# Patient Record
Sex: FEMALE | Race: WHITE | NOT HISPANIC OR LATINO
[De-identification: names, ages, dates, MRNs, and addresses within clinical notes are randomized per-mention and may not be internally consistent; named-entity substitution may affect disease eponyms.]

---

## 2019-06-30 PROBLEM — Z00.00 ENCOUNTER FOR PREVENTIVE HEALTH EXAMINATION: Status: ACTIVE | Noted: 2019-06-30

## 2019-07-22 ENCOUNTER — OTHER (OUTPATIENT)
Age: 69
End: 2019-07-22

## 2019-07-22 DIAGNOSIS — M25.559 PAIN IN UNSPECIFIED HIP: ICD-10-CM

## 2019-07-30 ENCOUNTER — APPOINTMENT (OUTPATIENT)
Dept: ORTHOPEDIC SURGERY | Facility: CLINIC | Age: 69
End: 2019-07-30
Payer: MEDICARE

## 2019-07-30 VITALS
BODY MASS INDEX: 26.5 KG/M2 | HEART RATE: 73 BPM | HEIGHT: 60 IN | WEIGHT: 135 LBS | SYSTOLIC BLOOD PRESSURE: 177 MMHG | DIASTOLIC BLOOD PRESSURE: 94 MMHG

## 2019-07-30 DIAGNOSIS — Z86.79 PERSONAL HISTORY OF OTHER DISEASES OF THE CIRCULATORY SYSTEM: ICD-10-CM

## 2019-07-30 DIAGNOSIS — Z87.39 PERSONAL HISTORY OF OTHER DISEASES OF THE MUSCULOSKELETAL SYSTEM AND CONNECTIVE TISSUE: ICD-10-CM

## 2019-07-30 DIAGNOSIS — Z80.9 FAMILY HISTORY OF MALIGNANT NEOPLASM, UNSPECIFIED: ICD-10-CM

## 2019-07-30 DIAGNOSIS — Z87.891 PERSONAL HISTORY OF NICOTINE DEPENDENCE: ICD-10-CM

## 2019-07-30 DIAGNOSIS — Z78.9 OTHER SPECIFIED HEALTH STATUS: ICD-10-CM

## 2019-07-30 DIAGNOSIS — Z82.62 FAMILY HISTORY OF OSTEOPOROSIS: ICD-10-CM

## 2019-07-30 DIAGNOSIS — Z85.828 PERSONAL HISTORY OF OTHER MALIGNANT NEOPLASM OF SKIN: ICD-10-CM

## 2019-07-30 DIAGNOSIS — Z86.39 PERSONAL HISTORY OF OTHER ENDOCRINE, NUTRITIONAL AND METABOLIC DISEASE: ICD-10-CM

## 2019-07-30 PROCEDURE — 73502 X-RAY EXAM HIP UNI 2-3 VIEWS: CPT

## 2019-07-30 PROCEDURE — 99203 OFFICE O/P NEW LOW 30 MIN: CPT

## 2019-07-30 RX ORDER — CARVEDILOL 3.12 MG/1
TABLET, FILM COATED ORAL
Refills: 0 | Status: ACTIVE | COMMUNITY

## 2019-07-30 RX ORDER — EPLERENONE 50 MG/1
TABLET, COATED ORAL
Refills: 0 | Status: ACTIVE | COMMUNITY

## 2019-07-30 RX ORDER — ALPRAZOLAM 2 MG/1
TABLET ORAL
Refills: 0 | Status: ACTIVE | COMMUNITY

## 2019-07-30 RX ORDER — LOSARTAN POTASSIUM 100 MG/1
TABLET, FILM COATED ORAL
Refills: 0 | Status: ACTIVE | COMMUNITY

## 2019-07-30 RX ORDER — SIMVASTATIN 40 MG/1
TABLET, FILM COATED ORAL
Refills: 0 | Status: ACTIVE | COMMUNITY

## 2019-07-30 NOTE — HISTORY OF PRESENT ILLNESS
[de-identified] : The patient is a 69 year old female being seen for evaluation of her left hip. She denies injury, trauma, or change of activity. She reports in May developing groin pain while having physical therapy for left knee pain. She is ambulating and transferring with pain and stiffness. She reports pain is centered in the groin. She reports pain is exacerbated with weightbearing. She denies locking or giving way of the left hip. She reports previously having a cortisone injection in the trochanteric bursa with mild relief. She reports continuing physical therapy with mild relief. She comes in today for evaluation of her left hip and for treatment options.

## 2019-07-30 NOTE — ADDENDUM
[FreeTextEntry1] : This note was authored by Joon Boles working as a medical scribe for Dr. Guzman Ashraf. The note was reviewed, edited, and revised by Dr. Guzman Ashraf whom is in agreement with the exam findings, imaging findings, and treatment plan. 07/30/2019.

## 2019-07-30 NOTE — PHYSICAL EXAM
[de-identified] : The patient appears well nourished  and in no apparent distress.  The patient is alert and oriented to person, place, and time.   Affect and mood appear normal. The head is normocephalic and atraumatic.  The eyes reveal normal sclera and extra ocular muscles are intact. The tongue is midline with no apparent lesions.  Skin shows normal turgor with no evidence of eczema or psoriasis.  No respiratory distress noted.  Sensation grossly intact.\par   [de-identified] : Exam of the left hip shows SLR without difficulty, hip flexion of 110 degrees, external rotation of 60 degrees, internal rotation of 20 degrees with lateral hip pain, pain with palpation of the trochanteric bursa. 5/5 motor strength bilaterally distally. Sensation intact distally.  [de-identified] : Xray- AP pelvis and 2 views left hip shows mild arthritis of the left hip.\par \par MRI - performed at [facility] on 5/12/2019 of the left hip- \par \par  Acute nondisplaced subchondral fracture, anterior left femoral head.\par \par Mildly degenerated labrum without tear.\par \par Mild osteoarthritis\par \par Left gluteus minimus tendinopathy. Old partial tear.

## 2019-07-30 NOTE — CONSULT LETTER
[Dear  ___] : Dear  [unfilled], [Consult Letter:] : I had the pleasure of evaluating your patient, [unfilled]. [Consult Closing:] : Thank you very much for allowing me to participate in the care of this patient.  If you have any questions, please do not hesitate to contact me. [Please see my note below.] : Please see my note below. [Sincerely,] : Sincerely, [FreeTextEntry2] : SAMIR Umanzor MD [FreeTextEntry3] : Guzman Ashraf MD\par Chief of Joint Replacement\par [facility] \par [facility]

## 2019-09-04 ENCOUNTER — OTHER (OUTPATIENT)
Age: 69
End: 2019-09-04

## 2020-04-09 PROBLEM — M25.559 HIP PAIN: Status: ACTIVE | Noted: 2019-07-22

## 2020-05-20 ENCOUNTER — APPOINTMENT (OUTPATIENT)
Dept: DISASTER EMERGENCY | Facility: CLINIC | Age: 70
End: 2020-05-20

## 2020-05-20 DIAGNOSIS — Z01.818 ENCOUNTER FOR OTHER PREPROCEDURAL EXAMINATION: ICD-10-CM

## 2020-05-21 LAB — SARS-COV-2 N GENE NPH QL NAA+PROBE: NOT DETECTED

## 2020-05-22 ENCOUNTER — RESULT REVIEW (OUTPATIENT)
Age: 70
End: 2020-05-22

## 2021-07-22 ENCOUNTER — RESULT REVIEW (OUTPATIENT)
Age: 71
End: 2021-07-22

## 2022-03-31 NOTE — DISCUSSION/SUMMARY
Pt called and informed of the following message:    Tom Escobar MD  P  Nurse Message Pool   Please inform the patient that her thyroid appears to be mildly under active.  She will need to follow up either with a primary care doctor or a referral to an endocrine doctor.  Thank you.     Pt verbalized understanding. No questions at this time.    [de-identified] : The patient is a 69 year old female with mild arthritis and a nondisplaced subchondral fracture of the left hip. She also has trochanteric bursitis.  He bursitis symptoms have improved after a cortisone injection by another physician.  I recommended physical therapy for IT band and tensor fascia stretching and range of motion and strengthening of the hip.  The subchondral fracture is in the anterior aspect of the femoral head which is in a less weightbearing area.  Her symptoms overall improving and I do not think a hip replacement is necessary at this time.  His future if this progresses we may reconsider.  For now she was advised to utilize a cane or walker whenever she develops an episode of pain and if it persists to contact us for followup

## 2022-07-05 ENCOUNTER — APPOINTMENT (OUTPATIENT)
Dept: ORTHOPEDIC SURGERY | Facility: CLINIC | Age: 72
End: 2022-07-05

## 2022-07-06 ENCOUNTER — APPOINTMENT (OUTPATIENT)
Dept: ORTHOPEDIC SURGERY | Facility: CLINIC | Age: 72
End: 2022-07-06

## 2022-07-06 VITALS
SYSTOLIC BLOOD PRESSURE: 150 MMHG | HEART RATE: 71 BPM | DIASTOLIC BLOOD PRESSURE: 87 MMHG | OXYGEN SATURATION: 99 % | WEIGHT: 130 LBS | BODY MASS INDEX: 25.52 KG/M2 | HEIGHT: 60 IN

## 2022-07-06 DIAGNOSIS — M84.452A PATHOLOGICAL FRACTURE, LEFT FEMUR, INITIAL ENCOUNTER FOR FRACTURE: ICD-10-CM

## 2022-07-06 DIAGNOSIS — M16.12 UNILATERAL PRIMARY OSTEOARTHRITIS, LEFT HIP: ICD-10-CM

## 2022-07-06 PROCEDURE — 73502 X-RAY EXAM HIP UNI 2-3 VIEWS: CPT | Mod: 26,LT

## 2022-07-06 PROCEDURE — 99214 OFFICE O/P EST MOD 30 MIN: CPT

## 2022-07-06 NOTE — PHYSICAL EXAM
[de-identified] : Multi body exam \par The patient appears well nourished and in no apparent distress. The patient is alert and oriented to person, place, and time. Affect and mood appear normal. The head is normocephalic and atraumatic. The eyes reveal normal sclera and extra ocular muscles are intact. The tongue is midline with no apparent lesions. Skin shows normal turgor with no evidence of eczema or psoriasis. No respiratory distress noted. Sensation grossly intact.\par   [de-identified] : Exam left hip: Skin is within normal limits there is no effusion.  Straight leg raise is smooth and intact.  Flexion to 100 degrees, external rotation to 35 degrees, internal rotation to 25 degrees, all with some pain to the groin.  Positive labral impingement test.  There is tenderness to palpation of the left GT bursa. [de-identified] : X-ray 2 views left hip and AP pelvis demonstrate mild osteoarthritis of the left hip, no signs of fracture.

## 2022-07-06 NOTE — HISTORY OF PRESENT ILLNESS
[de-identified] : Patient is 72-year-old female presenting for evaluation of longstanding intermittent left hip pain with recent exacerbation 2 weeks ago.  She states that 2 weeks ago she was simply turning over in bed when she felt a "crunching" to the groin and has since had an intense left groin pain.  In addition to this she has lateral hip pain on the left side.  Patient denies any other trauma denies any falls.  She notes the pain is worse with weightbearing activity including walking any distance and rising from a seated position.  She has not had injections intra-articularly.  She has tried therapy in the past without relief.  She takes anti-inflammatories and Tylenol without significant improvement.  She had an MRI in 2019 revealing subchondral insufficiency fracture, but no more recent MRI from this.

## 2022-07-06 NOTE — DISCUSSION/SUMMARY
[de-identified] : Patient is a 72-year-old female with mild osteoarthritis of the left hip, with GT bursitis, and possible stress fracture versus labral tear.  Her pain seems to be out of proportion to x-ray findings.  She is most concerned with the groin pain at this time and would like to leave the GT bursa alone.  I am sending patient for an MRI to rule out stress fracture vs labral tear.  She will use over-the-counter anti-inflammatories and Tylenol for now.  She will return once MRI is complete.

## 2022-07-21 ENCOUNTER — APPOINTMENT (OUTPATIENT)
Dept: MRI IMAGING | Facility: CLINIC | Age: 72
End: 2022-07-21

## 2022-07-21 ENCOUNTER — OUTPATIENT (OUTPATIENT)
Dept: OUTPATIENT SERVICES | Facility: HOSPITAL | Age: 72
LOS: 1 days | End: 2022-07-21
Payer: MEDICARE

## 2022-07-21 DIAGNOSIS — M16.12 UNILATERAL PRIMARY OSTEOARTHRITIS, LEFT HIP: ICD-10-CM

## 2022-07-21 PROCEDURE — 73721 MRI JNT OF LWR EXTRE W/O DYE: CPT | Mod: 26,LT,MH

## 2022-07-21 PROCEDURE — 73721 MRI JNT OF LWR EXTRE W/O DYE: CPT

## 2022-08-09 ENCOUNTER — APPOINTMENT (OUTPATIENT)
Dept: ORTHOPEDIC SURGERY | Facility: CLINIC | Age: 72
End: 2022-08-09

## 2022-08-09 VITALS
SYSTOLIC BLOOD PRESSURE: 144 MMHG | BODY MASS INDEX: 25.52 KG/M2 | HEART RATE: 76 BPM | DIASTOLIC BLOOD PRESSURE: 83 MMHG | HEIGHT: 60 IN | WEIGHT: 130 LBS

## 2022-08-09 DIAGNOSIS — S73.192A OTHER SPRAIN OF LEFT HIP, INITIAL ENCOUNTER: ICD-10-CM

## 2022-08-09 PROCEDURE — 99213 OFFICE O/P EST LOW 20 MIN: CPT

## 2022-08-09 NOTE — PHYSICAL EXAM
[de-identified] : The patient appears well nourished  and in no apparent distress.  The patient is alert and oriented to person, place, and time.   Affect and mood appear normal. The head is normocephalic and atraumatic.  The eyes reveal normal sclera and extra ocular muscles are intact. The tongue is midline with no apparent lesions.  Skin shows normal turgor with no evidence of eczema or psoriasis.  No respiratory distress noted.  Sensation grossly intact.		  [de-identified] :  Exam left hip: Skin is within normal limits there is no effusion. Straight leg raise is smooth and intact. Flexion to 100 degrees, external rotation to 35 degrees, internal rotation to 25 degrees, all without pain.  \par 	  [de-identified] : EXAM: [id number] - MRI HIP LT  - ORDERED BY: PARISA WADE\par PROCEDURE DATE:  07/21/2022\par IMPRESSION:\par No fracture. Focal undersurface anterior superior labral tear.\par Mild to moderate left hip arthrosis\par Small hip effusion/synovitis.  Subgluteus and greater trochanteric bursitis.\par  \par

## 2022-08-09 NOTE — ADDENDUM
[FreeTextEntry1] : This note was authored by Vikram Chu working as a medical scribe for Dr. Guzman Ashraf. The note was reviewed, edited, and revised by Dr. Guzman Ashraf whom is in agreement with the exam findings, imaging findings, and treatment plan. 08/09/2022

## 2022-08-09 NOTE — DISCUSSION/SUMMARY
[de-identified] : TAZ HUNT is a 72 year female who presents with moderate left hip arthritis. The patient declined an intraarticular cortisone injection order today because her symptoms are minimal currently, however, the patient will follow up with the office if she gets to a point where she feels her pain warrants an order for such an injection.  If that is the case she may call the office and we will schedule her for an injection with interventional radiology. she would then follow-up with me 3 weeks after the injection.

## 2022-08-09 NOTE — HISTORY OF PRESENT ILLNESS
[de-identified] : Patient is 72-year-old female presenting for evaluation of longstanding intermittent left hip pain with exacerbation 6 weeks ago.  She states that 6 weeks ago she was simply turning over in bed when she felt a "crunching" to the groin and has since had an intense left groin pain.  In addition to this she has lateral hip pain on the left side.  Patient denies any other trauma denies any falls.  She notes the pain is worse with weightbearing activity including walking any distance and rising from a seated position.  She has not had injections intra-articularly.  She has tried therapy in the past without relief.  She takes anti-inflammatories and Tylenol without significant improvement.  She had an MRI in 2019 revealing subchondral insufficiency fracture. She was seen 4 weeks ago and sent for a repeat MRI to rule out subchondral insufficiency fracture, due to pain out of proportion to physical exam and xray findings. She returns for MRI follow up.

## 2022-10-27 ENCOUNTER — RESULT REVIEW (OUTPATIENT)
Age: 72
End: 2022-10-27

## 2022-11-17 ENCOUNTER — OFFICE (OUTPATIENT)
Dept: URBAN - METROPOLITAN AREA CLINIC 112 | Facility: CLINIC | Age: 72
Setting detail: OPHTHALMOLOGY
End: 2022-11-17
Payer: MEDICARE

## 2022-11-17 DIAGNOSIS — H16.223: ICD-10-CM

## 2022-11-17 DIAGNOSIS — H25.11: ICD-10-CM

## 2022-11-17 DIAGNOSIS — H25.12: ICD-10-CM

## 2022-11-17 DIAGNOSIS — H25.13: ICD-10-CM

## 2022-11-17 PROCEDURE — 99213 OFFICE O/P EST LOW 20 MIN: CPT | Performed by: OPHTHALMOLOGY

## 2022-11-17 ASSESSMENT — CONFRONTATIONAL VISUAL FIELD TEST (CVF)
OS_FINDINGS: FULL
OD_FINDINGS: FULL

## 2022-11-17 ASSESSMENT — PUNCTA - ASSESSMENT
OD_PUNCTA: SIL PLUG
OS_PUNCTA: SIL PLUG

## 2022-11-17 ASSESSMENT — LID EXAM ASSESSMENTS
OD_BLEPHARITIS: RLL 2+
OS_TRICHIASIS: LLL T
OS_BLEPHARITIS: LLL 2+
OD_COMMENTS: MILDLY RED AND SCALY

## 2022-11-17 ASSESSMENT — KERATOMETRY
OD_AXISANGLE_DEGREES: 055
OS_K1POWER_DIOPTERS: 42.00
OD_K1POWER_DIOPTERS: 45.50
OS_AXISANGLE_DEGREES: 110
OD_K2POWER_DIOPTERS: 45.75
OS_K2POWER_DIOPTERS: 43.25

## 2022-11-17 ASSESSMENT — VISUAL ACUITY
OS_BCVA: 20/100-1
OD_BCVA: 20/40

## 2022-11-17 ASSESSMENT — SUPERFICIAL PUNCTATE KERATITIS (SPK)
OS_SPK: 1+
OD_SPK: 1+

## 2022-11-17 ASSESSMENT — TONOMETRY: OD_IOP_MMHG: 11

## 2023-01-18 ASSESSMENT — REFRACTION_MANIFEST
OS_VA1: 20/40+
OD_CYLINDER: SPH
OD_CYLINDER: SPH
OD_SPHERE: -3.75
OD_VA1: 20/30
OS_VA1: 20/40-
OS_SPHERE: +0.75
OD_SPHERE: -3.75
OS_SPHERE: +0.75
OS_AXIS: 012
OD_VA1: 20/30
OS_CYLINDER: SPH
OS_CYLINDER: -0.75

## 2023-01-18 ASSESSMENT — REFRACTION_CURRENTRX
OS_SPHERE: +2.25
OD_OVR_VA: 20/
OS_OVR_VA: 20/
OD_SPHERE: +2.25

## 2023-01-18 ASSESSMENT — KERATOMETRY
OD_K2POWER_DIOPTERS: 45.75
OD_AXISANGLE_DEGREES: 055
OS_AXISANGLE_DEGREES: 110
OS_K1POWER_DIOPTERS: 42.00
OS_K2POWER_DIOPTERS: 43.25
OD_K1POWER_DIOPTERS: 45.50

## 2023-01-18 ASSESSMENT — REFRACTION_AUTOREFRACTION
OD_AXIS: 013
OS_CYLINDER: -0.75
OS_SPHERE: +1.00
OD_SPHERE: -4.25
OS_AXIS: 013
OD_CYLINDER: -0.75
OS_SPHERE: +1.00
OD_CYLINDER: -0.50
OD_AXIS: 100
OS_CYLINDER: -1.00
OD_SPHERE: -3.50
OS_AXIS: 020

## 2023-01-18 ASSESSMENT — SPHEQUIV_DERIVED
OD_SPHEQUIV: -3.75
OS_SPHEQUIV: 0.625
OS_SPHEQUIV: 0.5
OD_SPHEQUIV: -4.625
OS_SPHEQUIV: 0.375

## 2023-01-18 ASSESSMENT — AXIALLENGTH_DERIVED
OD_AL: 24.6595
OS_AL: 23.6701
OS_AL: 23.7192
OD_AL: 24.2926
OS_AL: 23.7685

## 2023-01-20 ENCOUNTER — OFFICE (OUTPATIENT)
Dept: URBAN - METROPOLITAN AREA CLINIC 94 | Facility: CLINIC | Age: 73
Setting detail: OPHTHALMOLOGY
End: 2023-01-20
Payer: MEDICARE

## 2023-01-20 ENCOUNTER — RX ONLY (RX ONLY)
Age: 73
End: 2023-01-20

## 2023-01-20 ENCOUNTER — OFFICE (OUTPATIENT)
Dept: URBAN - METROPOLITAN AREA CLINIC 116 | Facility: CLINIC | Age: 73
Setting detail: OPHTHALMOLOGY
End: 2023-01-20
Payer: MEDICARE

## 2023-01-20 DIAGNOSIS — H25.13: ICD-10-CM

## 2023-01-20 DIAGNOSIS — H25.12: ICD-10-CM

## 2023-01-20 DIAGNOSIS — H16.223: ICD-10-CM

## 2023-01-20 DIAGNOSIS — Z01.812: ICD-10-CM

## 2023-01-20 DIAGNOSIS — H25.11: ICD-10-CM

## 2023-01-20 DIAGNOSIS — Z20.822: ICD-10-CM

## 2023-01-20 PROCEDURE — 99211 OFF/OP EST MAY X REQ PHY/QHP: CPT | Performed by: OPHTHALMOLOGY

## 2023-01-20 PROCEDURE — N/C NO CHARGE: Performed by: OPTOMETRIST

## 2023-01-20 ASSESSMENT — REFRACTION_AUTOREFRACTION
OS_CYLINDER: -0.75
OD_SPHERE: -3.50
OS_SPHERE: +0.75
OD_CYLINDER: -0.50
OD_SPHERE: -4.00
OS_AXIS: 005
OS_AXIS: 020
OD_CYLINDER: -1.25
OS_CYLINDER: -1.25
OD_AXIS: 100
OD_AXIS: 010
OS_SPHERE: +1.00

## 2023-01-20 ASSESSMENT — SUPERFICIAL PUNCTATE KERATITIS (SPK)
OD_SPK: 1+
OS_SPK: 1+

## 2023-01-20 ASSESSMENT — KERATOMETRY
OS_K2POWER_DIOPTERS: 43.25
OS_AXISANGLE_DEGREES: 110
OD_AXISANGLE_DEGREES: 095
OD_K2POWER_DIOPTERS: 47.75
OS_K1POWER_DIOPTERS: 42.00
OD_K1POWER_DIOPTERS: 45.50

## 2023-01-20 ASSESSMENT — VISUAL ACUITY
OD_BCVA: 20/40
OS_BCVA: 20/50

## 2023-01-20 ASSESSMENT — REFRACTION_MANIFEST
OS_CYLINDER: SPH
OD_CYLINDER: SPH
OD_VA1: 20/30
OS_CYLINDER: SPH
OD_VA1: 20/30
OS_VA1: 20/40+
OS_SPHERE: +0.75
OD_SPHERE: -3.75
OS_VA1: 20/40-
OS_SPHERE: +0.75
OS_SPHERE: +0.75
OS_CYLINDER: -0.75
OD_SPHERE: -3.75
OS_AXIS: 012
OD_CYLINDER: SPH
OD_SPHERE: -4.00
OD_CYLINDER: SPH

## 2023-01-20 ASSESSMENT — AXIALLENGTH_DERIVED
OS_AL: 23.8678
OS_AL: 23.6701
OD_AL: 24.2646
OS_AL: 23.7685
OD_AL: 23.9093

## 2023-01-20 ASSESSMENT — LID EXAM ASSESSMENTS
OD_COMMENTS: MILDLY RED AND SCALY
OD_BLEPHARITIS: RLL 2+
OS_BLEPHARITIS: LLL 2+
OS_TRICHIASIS: LLL T

## 2023-01-20 ASSESSMENT — REFRACTION_CURRENTRX
OD_SPHERE: +2.25
OS_SPHERE: +2.25
OD_OVR_VA: 20/
OS_OVR_VA: 20/

## 2023-01-20 ASSESSMENT — CONFRONTATIONAL VISUAL FIELD TEST (CVF)
OD_FINDINGS: FULL
OS_FINDINGS: FULL

## 2023-01-20 ASSESSMENT — PUNCTA - ASSESSMENT
OS_PUNCTA: SIL PLUG
OD_PUNCTA: SIL PLUG

## 2023-01-20 ASSESSMENT — SPHEQUIV_DERIVED
OS_SPHEQUIV: 0.625
OD_SPHEQUIV: -3.75
OS_SPHEQUIV: 0.125
OD_SPHEQUIV: -4.625
OS_SPHEQUIV: 0.375

## 2023-01-20 ASSESSMENT — TONOMETRY
OD_IOP_MMHG: 11
OS_IOP_MMHG: 13

## 2023-01-23 ASSESSMENT — SPHEQUIV_DERIVED
OD_SPHEQUIV: -4.625
OS_SPHEQUIV: 0.625
OD_SPHEQUIV: -3.75
OS_SPHEQUIV: 0.375
OS_SPHEQUIV: 0.125

## 2023-01-23 ASSESSMENT — AXIALLENGTH_DERIVED
OD_AL: 24.4605
OS_AL: 23.5835
OS_AL: 23.4866
OD_AL: 24.0994
OS_AL: 23.6813

## 2023-01-23 ASSESSMENT — REFRACTION_AUTOREFRACTION
OS_AXIS: 020
OD_SPHERE: -4.00
OD_CYLINDER: -0.50
OS_CYLINDER: -1.25
OS_SPHERE: +1.00
OD_AXIS: 010
OS_SPHERE: +0.75
OS_AXIS: 005
OD_CYLINDER: -1.25
OS_CYLINDER: -0.75
OD_AXIS: 100
OD_SPHERE: -3.50

## 2023-01-23 ASSESSMENT — KERATOMETRY
OS_K1POWER_DIOPTERS: 42.50
OD_CYLAXISANGLE_DEGREES: 95
OD_AXISANGLE2_DEGREES: 095
OD_K2POWER_DIOPTERS: 46.75
OS_AXISANGLE_DEGREES: 110
OS_AXISANGLE_DEGREES: 151
OD_K1POWER_DIOPTERS: 45.50
OS_CYLPOWER_DEGREES: 1.25
OD_AXISANGLE_DEGREES: 095
OS_K2POWER_DIOPTERS: 43.75
OS_K1POWER_DIOPTERS: 42.00
OS_AXISANGLE2_DEGREES: 151
OS_CYLAXISANGLE_DEGREES: 151
OS_K1K2_AVERAGE: 43.125
OD_CYLPOWER_DEGREES: 1.25
OD_K1POWER_DIOPTERS: 45.50
OD_AXISANGLE_DEGREES: 095
OD_K1K2_AVERAGE: 46.125
OS_K2POWER_DIOPTERS: 43.25
OD_K2POWER_DIOPTERS: 47.75

## 2023-01-23 ASSESSMENT — REFRACTION_CURRENTRX
OS_SPHERE: +2.25
OS_OVR_VA: 20/
OD_SPHERE: +2.25
OD_OVR_VA: 20/

## 2023-01-23 ASSESSMENT — REFRACTION_MANIFEST
OD_VA1: 20/30
OD_CYLINDER: SPH
OS_VA1: 20/40+
OD_SPHERE: -3.75
OS_CYLINDER: SPH
OS_AXIS: 012
OS_CYLINDER: SPH
OS_SPHERE: +0.75
OD_SPHERE: -3.75
OS_VA1: 20/40-
OS_SPHERE: +0.75
OD_SPHERE: -4.00
OD_CYLINDER: SPH
OS_CYLINDER: -0.75
OS_SPHERE: +0.75
OD_CYLINDER: SPH
OD_VA1: 20/30

## 2023-01-23 ASSESSMENT — SUPERFICIAL PUNCTATE KERATITIS (SPK)
OD_SPK: 1+
OS_SPK: 1+

## 2023-01-23 ASSESSMENT — VISUAL ACUITY
OD_BCVA: 20/40
OS_BCVA: 20/50

## 2023-01-25 ENCOUNTER — ASC (OUTPATIENT)
Dept: URBAN - METROPOLITAN AREA SURGERY 8 | Facility: SURGERY | Age: 73
Setting detail: OPHTHALMOLOGY
End: 2023-01-25
Payer: MEDICARE

## 2023-01-25 DIAGNOSIS — H25.12: ICD-10-CM

## 2023-01-25 DIAGNOSIS — H52.212: ICD-10-CM

## 2023-01-25 PROCEDURE — 66984 XCAPSL CTRC RMVL W/O ECP: CPT | Performed by: OPHTHALMOLOGY

## 2023-01-25 PROCEDURE — FEMTO CATARACT LASER: Performed by: OPHTHALMOLOGY

## 2023-01-25 PROCEDURE — A9270 NON-COVERED ITEM OR SERVICE: HCPCS | Performed by: OPHTHALMOLOGY

## 2023-01-26 ENCOUNTER — RX ONLY (RX ONLY)
Age: 73
End: 2023-01-26

## 2023-01-26 ENCOUNTER — OFFICE (OUTPATIENT)
Dept: URBAN - METROPOLITAN AREA CLINIC 94 | Facility: CLINIC | Age: 73
Setting detail: OPHTHALMOLOGY
End: 2023-01-26
Payer: MEDICARE

## 2023-01-26 DIAGNOSIS — Z96.1: ICD-10-CM

## 2023-01-26 PROCEDURE — 99024 POSTOP FOLLOW-UP VISIT: CPT | Performed by: OPHTHALMOLOGY

## 2023-01-26 ASSESSMENT — REFRACTION_AUTOREFRACTION
OD_CYLINDER: -0.50
OS_CYLINDER: -1.00
OD_SPHERE: -3.50
OD_CYLINDER: -0.50
OD_SPHERE: -3.75
OD_AXIS: 005
OS_SPHERE: -0.50
OS_AXIS: 040
OS_CYLINDER: -0.75
OS_SPHERE: +1.00
OS_AXIS: 020
OD_AXIS: 100

## 2023-01-26 ASSESSMENT — KERATOMETRY
OS_K2POWER_DIOPTERS: 43.75
OD_K2POWER_DIOPTERS: 46.75
OD_K1POWER_DIOPTERS: 45.50
OD_AXISANGLE_DEGREES: 095
OS_AXISANGLE_DEGREES: 151
OS_K1POWER_DIOPTERS: 42.50

## 2023-01-26 ASSESSMENT — REFRACTION_MANIFEST
OS_CYLINDER: SPH
OD_CYLINDER: SPH
OD_VA1: 20/30
OS_AXIS: 012
OD_SPHERE: -4.00
OD_SPHERE: -3.75
OD_CYLINDER: SPH
OS_CYLINDER: SPH
OS_VA1: 20/40-
OS_SPHERE: +0.75
OS_SPHERE: +0.75
OD_VA1: 20/30
OS_SPHERE: +0.75
OD_CYLINDER: SPH
OS_VA1: 20/40+
OS_CYLINDER: -0.75
OD_SPHERE: -3.75

## 2023-01-26 ASSESSMENT — LID EXAM ASSESSMENTS
OD_COMMENTS: MILDLY RED AND SCALY
OS_BLEPHARITIS: LLL 2+
OS_TRICHIASIS: LLL T
OD_BLEPHARITIS: RLL 2+

## 2023-01-26 ASSESSMENT — AXIALLENGTH_DERIVED
OS_AL: 24.1313
OD_AL: 24.0994
OS_AL: 23.4866
OS_AL: 23.5835
OD_AL: 24.2015

## 2023-01-26 ASSESSMENT — REFRACTION_CURRENTRX
OD_OVR_VA: 20/
OS_OVR_VA: 20/
OS_SPHERE: +2.25
OD_SPHERE: +2.25

## 2023-01-26 ASSESSMENT — SPHEQUIV_DERIVED
OD_SPHEQUIV: -3.75
OS_SPHEQUIV: 0.625
OD_SPHEQUIV: -4
OS_SPHEQUIV: 0.375
OS_SPHEQUIV: -1

## 2023-01-26 ASSESSMENT — VISUAL ACUITY
OS_BCVA: 20/100-1
OD_BCVA: 20/25

## 2023-01-26 ASSESSMENT — PUNCTA - ASSESSMENT
OD_PUNCTA: SIL PLUG
OS_PUNCTA: SIL PLUG

## 2023-01-26 ASSESSMENT — SUPERFICIAL PUNCTATE KERATITIS (SPK)
OD_SPK: 1+
OS_SPK: 1+

## 2023-01-26 ASSESSMENT — TONOMETRY
OS_IOP_MMHG: 14
OD_IOP_MMHG: 15

## 2023-02-02 ENCOUNTER — OFFICE (OUTPATIENT)
Dept: URBAN - METROPOLITAN AREA CLINIC 112 | Facility: CLINIC | Age: 73
Setting detail: OPHTHALMOLOGY
End: 2023-02-02
Payer: MEDICARE

## 2023-02-02 DIAGNOSIS — Z96.1: ICD-10-CM

## 2023-02-02 DIAGNOSIS — H25.11: ICD-10-CM

## 2023-02-02 PROCEDURE — 99024 POSTOP FOLLOW-UP VISIT: CPT | Performed by: OPHTHALMOLOGY

## 2023-02-02 PROCEDURE — 92136 OPHTHALMIC BIOMETRY: CPT | Performed by: OPHTHALMOLOGY

## 2023-02-02 ASSESSMENT — REFRACTION_MANIFEST
OD_VA1: 20/30
OS_VA1: 20/40+
OS_CYLINDER: SPH
OD_SPHERE: -3.75
OD_SPHERE: -4.00
OD_CYLINDER: SPH
OS_SPHERE: +0.75
OD_VA1: 20/30
OS_AXIS: 012
OS_CYLINDER: SPH
OS_VA1: 20/40-
OD_SPHERE: -3.75
OS_SPHERE: +0.75
OS_CYLINDER: -0.75
OS_SPHERE: +0.75
OD_CYLINDER: SPH
OD_CYLINDER: SPH

## 2023-02-02 ASSESSMENT — KERATOMETRY
OS_K2POWER_DIOPTERS: 43.75
OS_AXISANGLE_DEGREES: 151
OD_AXISANGLE_DEGREES: 095
OD_K1POWER_DIOPTERS: 45.50
OD_K2POWER_DIOPTERS: 46.75
OS_K1POWER_DIOPTERS: 42.50

## 2023-02-02 ASSESSMENT — REFRACTION_AUTOREFRACTION
OS_CYLINDER: -1.25
OD_AXIS: 023
OD_SPHERE: -3.50
OS_CYLINDER: -0.75
OS_SPHERE: +0.25
OD_CYLINDER: -0.25
OS_SPHERE: +1.00
OD_CYLINDER: -0.50
OD_SPHERE: -3.75
OS_AXIS: 42
OD_AXIS: 100
OS_AXIS: 020

## 2023-02-02 ASSESSMENT — AXIALLENGTH_DERIVED
OS_AL: 23.8792
OD_AL: 24.1504
OS_AL: 23.5835
OS_AL: 23.4866
OD_AL: 24.0994

## 2023-02-02 ASSESSMENT — REFRACTION_CURRENTRX
OS_SPHERE: +2.25
OS_OVR_VA: 20/
OD_SPHERE: +2.25
OD_OVR_VA: 20/

## 2023-02-02 ASSESSMENT — LID EXAM ASSESSMENTS
OS_BLEPHARITIS: LLL 2+
OD_COMMENTS: MILDLY RED AND SCALY
OS_TRICHIASIS: LLL T
OD_BLEPHARITIS: RLL 2+

## 2023-02-02 ASSESSMENT — SPHEQUIV_DERIVED
OD_SPHEQUIV: -3.75
OS_SPHEQUIV: 0.625
OS_SPHEQUIV: -0.375
OD_SPHEQUIV: -3.875
OS_SPHEQUIV: 0.375

## 2023-02-02 ASSESSMENT — VISUAL ACUITY
OS_BCVA: 20/80
OD_BCVA: 20/20

## 2023-02-02 ASSESSMENT — PUNCTA - ASSESSMENT
OD_PUNCTA: SIL PLUG
OS_PUNCTA: SIL PLUG

## 2023-02-02 ASSESSMENT — TONOMETRY
OS_IOP_MMHG: 16
OD_IOP_MMHG: 14

## 2023-02-02 ASSESSMENT — CONFRONTATIONAL VISUAL FIELD TEST (CVF)
OD_FINDINGS: FULL
OS_FINDINGS: FULL

## 2023-02-02 ASSESSMENT — SUPERFICIAL PUNCTATE KERATITIS (SPK)
OD_SPK: 1+
OS_SPK: 1+

## 2023-02-03 ENCOUNTER — OFFICE (OUTPATIENT)
Dept: URBAN - METROPOLITAN AREA CLINIC 94 | Facility: CLINIC | Age: 73
Setting detail: OPHTHALMOLOGY
End: 2023-02-03
Payer: MEDICARE

## 2023-02-03 DIAGNOSIS — Z20.822: ICD-10-CM

## 2023-02-03 DIAGNOSIS — Z01.812: ICD-10-CM

## 2023-02-03 PROCEDURE — 99211 OFF/OP EST MAY X REQ PHY/QHP: CPT | Performed by: OPHTHALMOLOGY

## 2023-02-06 ASSESSMENT — KERATOMETRY
OS_AXISANGLE_DEGREES: 151
OD_K1POWER_DIOPTERS: 45.50
OS_K2POWER_DIOPTERS: 43.75
OD_AXISANGLE_DEGREES: 095
OD_K2POWER_DIOPTERS: 46.75
OS_K1POWER_DIOPTERS: 42.50

## 2023-02-06 ASSESSMENT — SPHEQUIV_DERIVED
OS_SPHEQUIV: 0.375
OD_SPHEQUIV: -3.75
OS_SPHEQUIV: -0.375
OS_SPHEQUIV: 0.625
OD_SPHEQUIV: -3.875

## 2023-02-06 ASSESSMENT — REFRACTION_AUTOREFRACTION
OS_SPHERE: +1.00
OD_CYLINDER: -0.50
OS_AXIS: 42
OD_SPHERE: -3.75
OS_SPHERE: +0.25
OS_CYLINDER: -0.75
OD_AXIS: 023
OD_AXIS: 100
OD_SPHERE: -3.50
OD_CYLINDER: -0.25
OS_AXIS: 020
OS_CYLINDER: -1.25

## 2023-02-06 ASSESSMENT — VISUAL ACUITY
OS_BCVA: 20/80
OD_BCVA: 20/20

## 2023-02-06 ASSESSMENT — REFRACTION_MANIFEST
OS_SPHERE: +0.75
OD_SPHERE: -4.00
OS_SPHERE: +0.75
OD_VA1: 20/30
OS_CYLINDER: SPH
OD_CYLINDER: SPH
OD_SPHERE: -3.75
OS_SPHERE: +0.75
OS_VA1: 20/40-
OS_VA1: 20/40+
OD_SPHERE: -3.75
OD_CYLINDER: SPH
OD_CYLINDER: SPH
OS_CYLINDER: -0.75
OD_VA1: 20/30
OS_CYLINDER: SPH
OS_AXIS: 012

## 2023-02-06 ASSESSMENT — AXIALLENGTH_DERIVED
OS_AL: 23.8792
OD_AL: 24.0994
OD_AL: 24.1504
OS_AL: 23.4866
OS_AL: 23.5835

## 2023-02-06 ASSESSMENT — SUPERFICIAL PUNCTATE KERATITIS (SPK)
OD_SPK: 1+
OS_SPK: 1+

## 2023-02-06 ASSESSMENT — REFRACTION_CURRENTRX
OD_SPHERE: +2.25
OS_OVR_VA: 20/
OS_SPHERE: +2.25
OD_OVR_VA: 20/

## 2023-02-08 ENCOUNTER — ASC (OUTPATIENT)
Dept: URBAN - METROPOLITAN AREA SURGERY 8 | Facility: SURGERY | Age: 73
Setting detail: OPHTHALMOLOGY
End: 2023-02-08
Payer: MEDICARE

## 2023-02-08 DIAGNOSIS — H25.11: ICD-10-CM

## 2023-02-08 DIAGNOSIS — H52.211: ICD-10-CM

## 2023-02-08 PROCEDURE — 66984 XCAPSL CTRC RMVL W/O ECP: CPT | Performed by: OPHTHALMOLOGY

## 2023-02-08 PROCEDURE — FEMTO CATARACT LASER: Performed by: OPHTHALMOLOGY

## 2023-02-08 PROCEDURE — A9270 NON-COVERED ITEM OR SERVICE: HCPCS | Performed by: OPHTHALMOLOGY

## 2023-02-09 ENCOUNTER — RX ONLY (RX ONLY)
Age: 73
End: 2023-02-09

## 2023-02-09 ENCOUNTER — OFFICE (OUTPATIENT)
Dept: URBAN - METROPOLITAN AREA CLINIC 94 | Facility: CLINIC | Age: 73
Setting detail: OPHTHALMOLOGY
End: 2023-02-09

## 2023-02-09 DIAGNOSIS — Z96.1: ICD-10-CM

## 2023-02-09 PROBLEM — H25.11 CATARACT SENILE NUCLEAR SCLEROSIS;  , RIGHT EYE: Status: ACTIVE | Noted: 2023-01-26

## 2023-02-09 PROBLEM — H25.042 POSTERIOR SUBCAPSULAR CATARACT 366.14; LEFT EYE: Status: ACTIVE | Noted: 2023-01-20

## 2023-02-09 PROCEDURE — 99024 POSTOP FOLLOW-UP VISIT: CPT | Performed by: OPHTHALMOLOGY

## 2023-02-09 ASSESSMENT — SUPERFICIAL PUNCTATE KERATITIS (SPK)
OD_SPK: 1+
OS_SPK: 1+

## 2023-02-09 ASSESSMENT — REFRACTION_MANIFEST
OD_VA1: 20/30
OD_CYLINDER: SPH
OD_SPHERE: -3.75
OD_VA1: 20/30
OS_SPHERE: +0.75
OD_CYLINDER: SPH
OD_CYLINDER: SPH
OS_SPHERE: +0.75
OS_AXIS: 012
OS_CYLINDER: SPH
OD_SPHERE: -4.00
OS_CYLINDER: -0.75
OS_CYLINDER: SPH
OS_VA1: 20/40-
OS_SPHERE: +0.75
OD_SPHERE: -3.75
OS_VA1: 20/40+

## 2023-02-09 ASSESSMENT — REFRACTION_AUTOREFRACTION
OD_CYLINDER: -0.50
OD_CYLINDER: -0.25
OS_AXIS: 020
OD_AXIS: 023
OS_CYLINDER: -1.25
OS_SPHERE: +0.25
OD_SPHERE: -3.75
OS_SPHERE: +1.00
OD_SPHERE: -3.50
OD_AXIS: 100
OS_CYLINDER: -0.75
OS_AXIS: 42

## 2023-02-09 ASSESSMENT — SPHEQUIV_DERIVED
OS_SPHEQUIV: 0.625
OS_SPHEQUIV: 0.375
OD_SPHEQUIV: -3.875
OD_SPHEQUIV: -3.75
OS_SPHEQUIV: -0.375

## 2023-02-09 ASSESSMENT — AXIALLENGTH_DERIVED
OS_AL: 23.5835
OD_AL: 24.1504
OS_AL: 23.4866
OD_AL: 24.0994
OS_AL: 23.8792

## 2023-02-09 ASSESSMENT — PUNCTA - ASSESSMENT
OD_PUNCTA: SIL PLUG
OS_PUNCTA: SIL PLUG

## 2023-02-09 ASSESSMENT — VISUAL ACUITY
OD_BCVA: 20/20
OS_BCVA: 20/80

## 2023-02-09 ASSESSMENT — CONFRONTATIONAL VISUAL FIELD TEST (CVF)
OS_FINDINGS: FULL
OD_FINDINGS: FULL

## 2023-02-09 ASSESSMENT — LID EXAM ASSESSMENTS
OS_TRICHIASIS: LLL T
OD_COMMENTS: MILDLY RED AND SCALY
OS_BLEPHARITIS: LLL 2+
OD_BLEPHARITIS: RLL 2+

## 2023-02-09 ASSESSMENT — REFRACTION_CURRENTRX
OD_SPHERE: +2.25
OS_OVR_VA: 20/
OD_OVR_VA: 20/
OS_SPHERE: +2.25

## 2023-02-09 ASSESSMENT — KERATOMETRY
OS_AXISANGLE_DEGREES: 151
OD_AXISANGLE_DEGREES: 095
OS_K1POWER_DIOPTERS: 42.50
OS_K2POWER_DIOPTERS: 43.75
OD_K2POWER_DIOPTERS: 46.75
OD_K1POWER_DIOPTERS: 45.50

## 2023-02-16 ENCOUNTER — OFFICE (OUTPATIENT)
Dept: URBAN - METROPOLITAN AREA CLINIC 112 | Facility: CLINIC | Age: 73
Setting detail: OPHTHALMOLOGY
End: 2023-02-16
Payer: MEDICARE

## 2023-02-16 DIAGNOSIS — Z96.1: ICD-10-CM

## 2023-02-16 PROCEDURE — 99024 POSTOP FOLLOW-UP VISIT: CPT | Performed by: OPHTHALMOLOGY

## 2023-02-16 ASSESSMENT — LID EXAM ASSESSMENTS
OS_TRICHIASIS: LLL T
OD_COMMENTS: MILDLY RED AND SCALY
OD_BLEPHARITIS: RLL 2+
OS_BLEPHARITIS: LLL 2+

## 2023-02-16 ASSESSMENT — AXIALLENGTH_DERIVED
OS_AL: 24.3709
OS_AL: 23.7629
OS_AL: 23.8621
OD_AL: 24.1956
OD_AL: 24.0936

## 2023-02-16 ASSESSMENT — CONFRONTATIONAL VISUAL FIELD TEST (CVF)
OD_FINDINGS: FULL
OS_FINDINGS: FULL

## 2023-02-16 ASSESSMENT — KERATOMETRY
OD_K2POWER_DIOPTERS: 46.50
OS_K2POWER_DIOPTERS: 43.00
OD_AXISANGLE_DEGREES: 084
OS_AXISANGLE_DEGREES: 138
OD_K1POWER_DIOPTERS: 45.25
OS_K1POWER_DIOPTERS: 41.75

## 2023-02-16 ASSESSMENT — REFRACTION_MANIFEST
OD_VA1: 20/30
OS_SPHERE: +0.75
OS_CYLINDER: SPH
OS_VA1: 20/40-
OD_CYLINDER: SPH
OS_AXIS: 012
OD_SPHERE: -3.75
OS_CYLINDER: SPH
OS_SPHERE: +0.75
OD_SPHERE: -3.75
OS_CYLINDER: -0.75
OS_SPHERE: +0.75
OD_CYLINDER: SPH
OD_VA1: 20/30
OD_SPHERE: -4.00
OD_CYLINDER: SPH
OS_VA1: 20/40+

## 2023-02-16 ASSESSMENT — SPHEQUIV_DERIVED
OD_SPHEQUIV: -3.75
OS_SPHEQUIV: 0.375
OS_SPHEQUIV: -0.875
OS_SPHEQUIV: 0.625
OD_SPHEQUIV: -3.5

## 2023-02-16 ASSESSMENT — REFRACTION_CURRENTRX
OS_SPHERE: +2.25
OD_SPHERE: +2.25
OD_OVR_VA: 20/
OS_OVR_VA: 20/

## 2023-02-16 ASSESSMENT — REFRACTION_AUTOREFRACTION
OD_SPHERE: -3.00
OS_AXIS: 020
OS_SPHERE: 0.00
OS_CYLINDER: -0.75
OD_AXIS: 165
OD_SPHERE: -3.50
OS_AXIS: 017
OS_CYLINDER: -1.75
OD_CYLINDER: -0.50
OD_CYLINDER: -1.00
OD_AXIS: 100
OS_SPHERE: +1.00

## 2023-02-16 ASSESSMENT — TONOMETRY
OD_IOP_MMHG: 14
OS_IOP_MMHG: 10

## 2023-02-16 ASSESSMENT — VISUAL ACUITY
OD_BCVA: 20/20
OS_BCVA: 20/80

## 2023-02-16 ASSESSMENT — PUNCTA - ASSESSMENT
OD_PUNCTA: SIL PLUG
OS_PUNCTA: SIL PLUG

## 2023-02-16 ASSESSMENT — SUPERFICIAL PUNCTATE KERATITIS (SPK)
OS_SPK: 1+
OD_SPK: 1+

## 2023-03-16 ENCOUNTER — OFFICE (OUTPATIENT)
Dept: URBAN - METROPOLITAN AREA CLINIC 112 | Facility: CLINIC | Age: 73
Setting detail: OPHTHALMOLOGY
End: 2023-03-16
Payer: MEDICARE

## 2023-03-16 DIAGNOSIS — H16.223: ICD-10-CM

## 2023-03-16 DIAGNOSIS — Z96.1: ICD-10-CM

## 2023-03-16 PROCEDURE — 99024 POSTOP FOLLOW-UP VISIT: CPT | Performed by: OPHTHALMOLOGY

## 2023-03-16 ASSESSMENT — LID EXAM ASSESSMENTS
OS_BLEPHARITIS: LLL 2+
OS_TRICHIASIS: LLL T
OD_COMMENTS: MILDLY RED AND SCALY
OD_BLEPHARITIS: RLL 2+

## 2023-03-16 ASSESSMENT — REFRACTION_AUTOREFRACTION
OD_AXIS: 100
OS_CYLINDER: -0.75
OS_AXIS: 020
OD_SPHERE: -3.50
OD_CYLINDER: -0.50
OS_SPHERE: 0.00
OS_CYLINDER: -1.75
OD_AXIS: 165
OS_AXIS: 017
OD_SPHERE: -3.00
OS_SPHERE: +1.00
OD_CYLINDER: -1.00

## 2023-03-16 ASSESSMENT — KERATOMETRY
OD_K2POWER_DIOPTERS: 46.50
OS_K1POWER_DIOPTERS: 41.75
OD_AXISANGLE_DEGREES: 084
OS_AXISANGLE_DEGREES: 138
OS_K2POWER_DIOPTERS: 43.00
OD_K1POWER_DIOPTERS: 45.25

## 2023-03-16 ASSESSMENT — REFRACTION_MANIFEST
OS_VA1: 20/40-
OS_SPHERE: +0.75
OS_SPHERE: +0.75
OS_VA1: 20/40+
OD_VA1: 20/30
OS_CYLINDER: -0.75
OD_SPHERE: -4.00
OD_VA1: 20/30
OS_CYLINDER: SPH
OS_AXIS: 012
OS_SPHERE: +0.75
OD_SPHERE: -3.75
OD_CYLINDER: SPH
OS_CYLINDER: SPH
OD_CYLINDER: SPH
OD_SPHERE: -3.75
OD_CYLINDER: SPH

## 2023-03-16 ASSESSMENT — REFRACTION_CURRENTRX
OD_OVR_VA: 20/
OS_OVR_VA: 20/
OD_SPHERE: +2.25
OS_SPHERE: +2.25

## 2023-03-16 ASSESSMENT — CONFRONTATIONAL VISUAL FIELD TEST (CVF)
OD_FINDINGS: FULL
OS_FINDINGS: FULL

## 2023-03-16 ASSESSMENT — VISUAL ACUITY
OS_BCVA: 20/100
OD_BCVA: 20/20-1

## 2023-03-16 ASSESSMENT — AXIALLENGTH_DERIVED
OD_AL: 24.1956
OS_AL: 23.8621
OD_AL: 24.0936
OS_AL: 24.3709
OS_AL: 23.7629

## 2023-03-16 ASSESSMENT — SUPERFICIAL PUNCTATE KERATITIS (SPK)
OD_SPK: 1+
OS_SPK: 1+

## 2023-03-16 ASSESSMENT — SPHEQUIV_DERIVED
OS_SPHEQUIV: -0.875
OS_SPHEQUIV: 0.375
OD_SPHEQUIV: -3.75
OD_SPHEQUIV: -3.5
OS_SPHEQUIV: 0.625

## 2023-03-16 ASSESSMENT — PUNCTA - ASSESSMENT
OD_PUNCTA: SIL PLUG
OS_PUNCTA: SIL PLUG

## 2023-03-16 ASSESSMENT — TONOMETRY: OD_IOP_MMHG: 13

## 2023-04-04 ENCOUNTER — OFFICE (OUTPATIENT)
Dept: URBAN - METROPOLITAN AREA CLINIC 12 | Facility: CLINIC | Age: 73
Setting detail: OPHTHALMOLOGY
End: 2023-04-04
Payer: MEDICARE

## 2023-04-04 DIAGNOSIS — H11.431: ICD-10-CM

## 2023-04-04 PROCEDURE — 99024 POSTOP FOLLOW-UP VISIT: CPT | Performed by: OPTOMETRIST

## 2023-04-04 ASSESSMENT — REFRACTION_AUTOREFRACTION
OD_AXIS: 015
OD_SPHERE: -3.50
OS_SPHERE: -0.50
OS_CYLINDER: -0.75
OS_AXIS: 020
OS_AXIS: 007
OS_CYLINDER: -1.75
OS_SPHERE: +1.00
OD_SPHERE: -2.50
OD_AXIS: 100
OD_CYLINDER: -1.50
OD_CYLINDER: -0.50

## 2023-04-04 ASSESSMENT — LID EXAM ASSESSMENTS
OS_BLEPHARITIS: LLL 2+
OD_COMMENTS: MILDLY RED AND SCALY
OD_BLEPHARITIS: RLL 2+
OS_TRICHIASIS: LLL T

## 2023-04-04 ASSESSMENT — REFRACTION_MANIFEST
OD_CYLINDER: SPH
OS_CYLINDER: SPH
OD_VA1: 20/30
OD_VA1: 20/30
OS_SPHERE: +0.75
OD_SPHERE: -3.75
OD_SPHERE: -4.00
OS_CYLINDER: -0.75
OS_VA1: 20/40+
OS_CYLINDER: SPH
OD_SPHERE: -3.75
OD_CYLINDER: SPH
OD_CYLINDER: SPH
OS_VA1: 20/40-
OS_SPHERE: +0.75
OS_AXIS: 012
OS_SPHERE: +0.75

## 2023-04-04 ASSESSMENT — VISUAL ACUITY
OD_BCVA: 20/30+1
OS_BCVA: 20/100-1

## 2023-04-04 ASSESSMENT — TONOMETRY
OD_IOP_MMHG: 15
OS_IOP_MMHG: 11

## 2023-04-04 ASSESSMENT — CONFRONTATIONAL VISUAL FIELD TEST (CVF)
OS_FINDINGS: FULL
OD_FINDINGS: FULL

## 2023-04-04 ASSESSMENT — AXIALLENGTH_DERIVED
OD_AL: 23.9565
OS_AL: 24.3828
OS_AL: 23.6757
OS_AL: 23.578
OD_AL: 23.7573

## 2023-04-04 ASSESSMENT — SPHEQUIV_DERIVED
OS_SPHEQUIV: 0.375
OS_SPHEQUIV: -1.375
OS_SPHEQUIV: 0.625
OD_SPHEQUIV: -3.75
OD_SPHEQUIV: -3.25

## 2023-04-04 ASSESSMENT — REFRACTION_CURRENTRX
OD_SPHERE: +2.25
OD_OVR_VA: 20/
OS_SPHERE: +2.25
OS_OVR_VA: 20/

## 2023-04-04 ASSESSMENT — CORNEAL TRAUMA - ABRASION: OD_ABRASION: ABSENT

## 2023-04-04 ASSESSMENT — KERATOMETRY
OS_K2POWER_DIOPTERS: 43.75
OD_K1POWER_DIOPTERS: 45.50
OD_K2POWER_DIOPTERS: 47.50
OD_AXISANGLE_DEGREES: 099
OS_K1POWER_DIOPTERS: 42.00
OS_AXISANGLE_DEGREES: 094

## 2023-04-04 ASSESSMENT — SUPERFICIAL PUNCTATE KERATITIS (SPK)
OS_SPK: T
OD_SPK: T

## 2023-04-04 ASSESSMENT — PUNCTA - ASSESSMENT
OS_PUNCTA: SIL PLUG
OD_PUNCTA: SIL PLUG

## 2023-06-15 ENCOUNTER — RX ONLY (RX ONLY)
Age: 73
End: 2023-06-15

## 2023-06-15 ENCOUNTER — OFFICE (OUTPATIENT)
Dept: URBAN - METROPOLITAN AREA CLINIC 112 | Facility: CLINIC | Age: 73
Setting detail: OPHTHALMOLOGY
End: 2023-06-15
Payer: MEDICARE

## 2023-06-15 DIAGNOSIS — H16.223: ICD-10-CM

## 2023-06-15 DIAGNOSIS — H11.431: ICD-10-CM

## 2023-06-15 DIAGNOSIS — H16.221: ICD-10-CM

## 2023-06-15 DIAGNOSIS — H16.222: ICD-10-CM

## 2023-06-15 PROCEDURE — 83861 MICROFLUID ANALY TEARS: CPT | Performed by: OPHTHALMOLOGY

## 2023-06-15 PROCEDURE — 99213 OFFICE O/P EST LOW 20 MIN: CPT | Performed by: OPHTHALMOLOGY

## 2023-06-15 ASSESSMENT — REFRACTION_MANIFEST
OD_SPHERE: -4.00
OD_VA1: 20/30
OS_SPHERE: +0.75
OS_VA1: 20/40+
OS_AXIS: 012
OS_CYLINDER: SPH
OS_CYLINDER: -0.75
OS_CYLINDER: SPH
OD_CYLINDER: SPH
OD_CYLINDER: SPH
OD_SPHERE: -3.75
OD_VA1: 20/30
OS_VA1: 20/40-
OD_CYLINDER: SPH
OS_SPHERE: +0.75
OS_SPHERE: +0.75
OD_SPHERE: -3.75

## 2023-06-15 ASSESSMENT — KERATOMETRY
OD_AXISANGLE_DEGREES: 097
OD_K2POWER_DIOPTERS: 47.00
OD_K1POWER_DIOPTERS: 45.50
OS_K1POWER_DIOPTERS: 42.25
OS_K2POWER_DIOPTERS: 43.25
OS_AXISANGLE_DEGREES: 116

## 2023-06-15 ASSESSMENT — VISUAL ACUITY
OD_BCVA: 20/20
OS_BCVA: 20/50

## 2023-06-15 ASSESSMENT — PUNCTA - ASSESSMENT
OS_PUNCTA: SIL PLUG
OD_PUNCTA: SIL PLUG

## 2023-06-15 ASSESSMENT — REFRACTION_CURRENTRX
OS_OVR_VA: 20/
OD_SPHERE: +2.25
OD_OVR_VA: 20/
OS_SPHERE: +2.25

## 2023-06-15 ASSESSMENT — REFRACTION_AUTOREFRACTION
OS_SPHERE: 0.00
OS_CYLINDER: -0.75
OD_CYLINDER: -0.50
OD_SPHERE: -3.50
OS_AXIS: 020
OD_AXIS: 100
OD_AXIS: 006
OS_SPHERE: +1.00
OS_CYLINDER: -2.00
OS_AXIS: 022
OD_CYLINDER: -1.25
OD_SPHERE: -2.75

## 2023-06-15 ASSESSMENT — SPHEQUIV_DERIVED
OS_SPHEQUIV: 0.375
OS_SPHEQUIV: 0.625
OD_SPHEQUIV: -3.375
OD_SPHEQUIV: -3.75
OS_SPHEQUIV: -1

## 2023-06-15 ASSESSMENT — TONOMETRY
OD_IOP_MMHG: 15
OS_IOP_MMHG: 10

## 2023-06-15 ASSESSMENT — AXIALLENGTH_DERIVED
OD_AL: 23.9007
OS_AL: 24.2763
OS_AL: 23.6239
OD_AL: 24.0516
OS_AL: 23.722

## 2023-06-15 ASSESSMENT — CORNEAL TRAUMA - ABRASION: OD_ABRASION: ABSENT

## 2023-06-15 ASSESSMENT — SUPERFICIAL PUNCTATE KERATITIS (SPK)
OD_SPK: T
OS_SPK: T

## 2023-06-15 ASSESSMENT — CONFRONTATIONAL VISUAL FIELD TEST (CVF)
OD_FINDINGS: FULL
OS_FINDINGS: FULL

## 2023-07-12 ENCOUNTER — OFFICE (OUTPATIENT)
Dept: URBAN - METROPOLITAN AREA CLINIC 116 | Facility: CLINIC | Age: 73
Setting detail: OPHTHALMOLOGY
End: 2023-07-12
Payer: MEDICARE

## 2023-07-12 DIAGNOSIS — H52.11: ICD-10-CM

## 2023-07-12 PROCEDURE — CLEST CL LENS FIT ESTABLISHED WEARER FITTING ONLY: Performed by: OPTOMETRIST

## 2023-07-12 ASSESSMENT — REFRACTION_MANIFEST
OS_CYLINDER: SPH
OD_SPHERE: -3.75
OS_SPHERE: +0.75
OS_AXIS: 012
OD_VA1: 20/30
OS_SPHERE: +0.75
OS_CYLINDER: -0.75
OD_SPHERE: -4.00
OD_CYLINDER: SPH
OS_CYLINDER: SPH
OS_VA1: 20/40-
OS_SPHERE: +0.75
OD_SPHERE: -3.75
OS_VA1: 20/40+
OD_CYLINDER: SPH
OD_CYLINDER: SPH
OD_VA1: 20/30

## 2023-07-12 ASSESSMENT — REFRACTION_CURRENTRX
OD_OVR_VA: 20/
OS_SPHERE: +2.25
OS_OVR_VA: 20/
OD_SPHERE: +2.25

## 2023-07-12 ASSESSMENT — PUNCTA - ASSESSMENT
OD_PUNCTA: SIL PLUG
OS_PUNCTA: SIL PLUG

## 2023-07-12 ASSESSMENT — LID EXAM ASSESSMENTS
OD_BLEPHARITIS: RLL 2+
OD_COMMENTS: MILDLY RED AND SCALY
OS_TRICHIASIS: LLL T
OS_BLEPHARITIS: LLL 2+

## 2023-07-12 ASSESSMENT — SPHEQUIV_DERIVED
OS_SPHEQUIV: 0.375
OS_SPHEQUIV: -0.875
OD_SPHEQUIV: -3.375
OS_SPHEQUIV: 0.625
OD_SPHEQUIV: -3.75

## 2023-07-12 ASSESSMENT — REFRACTION_AUTOREFRACTION
OD_SPHERE: -2.75
OD_SPHERE: -3.50
OS_AXIS: 030
OD_AXIS: 100
OS_AXIS: 020
OD_CYLINDER: -0.50
OD_CYLINDER: -1.25
OS_CYLINDER: -1.75
OS_CYLINDER: -0.75
OS_SPHERE: 0.00
OS_SPHERE: +1.00
OD_AXIS: 015

## 2023-07-12 ASSESSMENT — VISUAL ACUITY
OS_BCVA: 20/50
OD_BCVA: 20/20

## 2023-07-12 ASSESSMENT — AXIALLENGTH_DERIVED
OS_AL: 23.578
OD_AL: 24.1474
OS_AL: 23.6757
OD_AL: 23.9953
OS_AL: 24.1765

## 2023-07-12 ASSESSMENT — KERATOMETRY
OS_K1POWER_DIOPTERS: 42.00
OD_AXISANGLE_DEGREES: 115
OS_AXISANGLE_DEGREES: 115
OS_K2POWER_DIOPTERS: 43.75
OD_K2POWER_DIOPTERS: 46.50
OD_K1POWER_DIOPTERS: 45.50

## 2023-07-12 ASSESSMENT — SUPERFICIAL PUNCTATE KERATITIS (SPK)
OD_SPK: T
OS_SPK: T

## 2023-07-12 ASSESSMENT — TONOMETRY: OD_IOP_MMHG: 14

## 2023-07-12 ASSESSMENT — CORNEAL TRAUMA - ABRASION: OD_ABRASION: ABSENT

## 2023-07-12 ASSESSMENT — CONFRONTATIONAL VISUAL FIELD TEST (CVF)
OD_FINDINGS: FULL
OS_FINDINGS: FULL

## 2023-07-21 ENCOUNTER — OFFICE (OUTPATIENT)
Dept: URBAN - METROPOLITAN AREA CLINIC 116 | Facility: CLINIC | Age: 73
Setting detail: OPHTHALMOLOGY
End: 2023-07-21
Payer: MEDICARE

## 2023-07-21 DIAGNOSIS — H52.11: ICD-10-CM

## 2023-07-21 PROCEDURE — N/C NO CHARGE: Performed by: OPTOMETRIST

## 2023-07-21 ASSESSMENT — REFRACTION_AUTOREFRACTION
OS_SPHERE: +1.00
OD_AXIS: 100
OD_CYLINDER: -0.50
OS_CYLINDER: -0.75
OD_SPHERE: -3.50
OS_AXIS: 020

## 2023-07-21 ASSESSMENT — LID EXAM ASSESSMENTS
OS_BLEPHARITIS: LLL 2+
OD_BLEPHARITIS: RLL 2+
OS_TRICHIASIS: LLL T
OD_COMMENTS: MILDLY RED AND SCALY

## 2023-07-21 ASSESSMENT — SPHEQUIV_DERIVED
OD_SPHEQUIV: -3.75
OS_SPHEQUIV: 0.375
OS_SPHEQUIV: 0.625

## 2023-07-21 ASSESSMENT — REFRACTION_MANIFEST
OS_AXIS: 012
OS_VA1: 20/40+
OS_CYLINDER: SPH
OD_CYLINDER: SPH
OS_SPHERE: +0.75
OS_SPHERE: +0.75
OD_CYLINDER: SPH
OS_SPHERE: +0.75
OS_CYLINDER: -0.75
OD_VA1: 20/30
OD_SPHERE: -3.75
OD_SPHERE: -4.00
OS_CYLINDER: SPH
OS_VA1: 20/40-
OD_CYLINDER: SPH
OD_VA1: 20/30
OD_SPHERE: -3.75

## 2023-07-21 ASSESSMENT — REFRACTION_CURRENTRX
OD_OVR_VA: 20/
OD_SPHERE: +2.25
OS_OVR_VA: 20/
OS_SPHERE: +2.25

## 2023-07-21 ASSESSMENT — SUPERFICIAL PUNCTATE KERATITIS (SPK)
OD_SPK: T
OS_SPK: T

## 2023-07-21 ASSESSMENT — CONFRONTATIONAL VISUAL FIELD TEST (CVF)
OD_FINDINGS: FULL
OS_FINDINGS: FULL

## 2023-07-21 ASSESSMENT — PUNCTA - ASSESSMENT
OS_PUNCTA: SIL PLUG
OD_PUNCTA: SIL PLUG

## 2023-07-21 ASSESSMENT — VISUAL ACUITY
OS_BCVA: 20/50
OD_BCVA: 20/20

## 2023-07-21 ASSESSMENT — CORNEAL TRAUMA - ABRASION: OD_ABRASION: ABSENT

## 2023-09-05 ENCOUNTER — APPOINTMENT (OUTPATIENT)
Dept: ORTHOPEDIC SURGERY | Facility: CLINIC | Age: 73
End: 2023-09-05
Payer: MEDICARE

## 2023-09-05 VITALS
BODY MASS INDEX: 27.62 KG/M2 | HEART RATE: 72 BPM | DIASTOLIC BLOOD PRESSURE: 81 MMHG | SYSTOLIC BLOOD PRESSURE: 117 MMHG | HEIGHT: 59 IN | WEIGHT: 137 LBS

## 2023-09-05 PROCEDURE — 99214 OFFICE O/P EST MOD 30 MIN: CPT

## 2023-09-05 PROCEDURE — 72040 X-RAY EXAM NECK SPINE 2-3 VW: CPT

## 2023-09-05 RX ORDER — METHYLPREDNISOLONE 4 MG/1
4 TABLET ORAL
Qty: 1 | Refills: 0 | Status: ACTIVE | COMMUNITY
Start: 2023-09-05 | End: 1900-01-01

## 2023-09-05 NOTE — HISTORY OF PRESENT ILLNESS
[de-identified] : Is a very pleasant 73-year-old female who underwent carotid surgery at [facility] in the spring 2023 ever since then she feels off balance lightheaded etc.  She has had neurology evaluations physical therapy evaluations.  She states physical therapy acutely exacerbates her cervical pain.  Unable to take anti-inflammatory secondary to risk of CVA type events and GI bleeding [Worsening] : worsening [___ mths] : [unfilled] month(s) ago [5] : a current pain level of 5/10 [10] : a maximum pain level of 10/10 [Intermit.] : ~He/She~ states the symptoms seem to be intermittent [Physical Therapy] : relieved by physical therapy [Rest] : relieved by rest [Ataxia] : ataxia [Incontinence] : no incontinence [Loss of Dexterity] : loss of dexterity [Urinary Ret.] : no urinary retention [de-identified] : Cervical range of motion and rotation

## 2023-09-05 NOTE — DISCUSSION/SUMMARY
[de-identified] : Medrol Dosepak has been provided as well as a cervical MRI prescription patient underwent a carotid endarterectomy she has been evaluated by physical therapy as well as neurology with again persistent and primary complaint of being off balance based upon the positive NETTIE questionnaire positive hyperreflexia Madelaine sign recommending a cervical MRI to make sure there is no severe cervical stenosis and or underlying cervical myelopathy.  Patient will follow-up after the cervical MRI is complete

## 2023-09-05 NOTE — PHYSICAL EXAM
[Wide-Based] : wide-based [de-identified] : CONSTITUTIONAL:  Patient is a very pleasant individual who is well-nourished and appears stated age.  PSYCHIATRIC:  Alert and oriented times three and in no apparent distress, and participates with orthopedic evaluation well. HEAD:  Atraumatic and  nonsyndromic in appearance. EENT: No thyromegaly, EOMI. RESPIRATORY:  Respiratory rate is regular, not dyspneic on examination. LYMPHATICS:  There is no cervical or axillary lymphadenopathy. INTEGUMENTARY:  Skin is clean, dry, and intact about the bilateral upper extremities and cervical spine.  VASCULAR:   There is brisk capillary refill about the bilateral upper extremities and radial pulses are 2/4.  NEUROLOGIC: Positive L'hirmitte, positive Spurling's sign. There are  pathologic reflexes lower extremity hyperreflexia left greater than the right as well as a left upper extremity Madelaine sign. There is no decrease in sensation of the bilateral upper extremities on Wartenberg pinwheel examination.  Deep tendon reflexes are well-maintained at +2/4 of the bilateral upper extremities and are symmetric. MUSCULOSKELETAL:  There is no visible muscular atrophy.  Manual motor strength is well maintained in the bilateral upper extremities.  Cervical range of motion is well maintained to approximately 50% and/or less..  The patient ambulates in a myelopathic manner. Normal secondary orthopaedic exam of bilateral shoulders, elbows and hands.  Elbow flexion and extension, wrist extension, finger flexion and abduction are well maintained.    [de-identified] : X-rays of the cervical spine have been reviewed on today's date that shows advanced cervical spondylosis at C3-C4 [id number].  2 views cervical spine reviewed on September 5, 2023

## 2023-09-05 NOTE — REVIEW OF SYSTEMS
[Joint Pain] : joint pain [Joint Stiffness] : joint stiffness [Joint Swelling] : joint swelling [Negative] : Heme/Lymph [FreeTextEntry9] : neck pain

## 2023-09-09 ENCOUNTER — APPOINTMENT (OUTPATIENT)
Dept: MRI IMAGING | Facility: CLINIC | Age: 73
End: 2023-09-09

## 2023-10-14 ENCOUNTER — APPOINTMENT (OUTPATIENT)
Dept: MRI IMAGING | Facility: CLINIC | Age: 73
End: 2023-10-14
Payer: MEDICARE

## 2023-10-14 ENCOUNTER — OUTPATIENT (OUTPATIENT)
Dept: OUTPATIENT SERVICES | Facility: HOSPITAL | Age: 73
LOS: 1 days | End: 2023-10-14
Payer: MEDICARE

## 2023-10-14 DIAGNOSIS — M47.812 SPONDYLOSIS WITHOUT MYELOPATHY OR RADICULOPATHY, CERVICAL REGION: ICD-10-CM

## 2023-10-14 PROCEDURE — 72141 MRI NECK SPINE W/O DYE: CPT | Mod: 26,MH

## 2023-10-14 PROCEDURE — 72141 MRI NECK SPINE W/O DYE: CPT

## 2023-10-16 ENCOUNTER — TRANSCRIPTION ENCOUNTER (OUTPATIENT)
Age: 73
End: 2023-10-16

## 2023-11-03 ENCOUNTER — APPOINTMENT (OUTPATIENT)
Dept: ORTHOPEDIC SURGERY | Facility: CLINIC | Age: 73
End: 2023-11-03
Payer: MEDICARE

## 2023-11-03 VITALS
WEIGHT: 137 LBS | HEIGHT: 59 IN | SYSTOLIC BLOOD PRESSURE: 156 MMHG | BODY MASS INDEX: 27.62 KG/M2 | DIASTOLIC BLOOD PRESSURE: 87 MMHG | HEART RATE: 76 BPM

## 2023-11-03 DIAGNOSIS — M47.812 SPONDYLOSIS W/OUT MYELOPATHY OR RADICULOPATHY, CERVICAL REGION: ICD-10-CM

## 2023-11-03 PROCEDURE — 99214 OFFICE O/P EST MOD 30 MIN: CPT

## 2023-11-22 ENCOUNTER — OFFICE (OUTPATIENT)
Dept: URBAN - METROPOLITAN AREA CLINIC 112 | Facility: CLINIC | Age: 73
Setting detail: OPHTHALMOLOGY
End: 2023-11-22
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
## 2023-11-22 PROCEDURE — NO SHOW FE NO SHOW FEE: Performed by: OPHTHALMOLOGY
